# Patient Record
Sex: FEMALE | Race: WHITE | NOT HISPANIC OR LATINO | Employment: FULL TIME | URBAN - METROPOLITAN AREA
[De-identification: names, ages, dates, MRNs, and addresses within clinical notes are randomized per-mention and may not be internally consistent; named-entity substitution may affect disease eponyms.]

---
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
## 2018-07-18 ENCOUNTER — OFFICE VISIT (OUTPATIENT)
Dept: FAMILY MEDICINE | Facility: CLINIC | Age: 18
End: 2018-07-18
Payer: COMMERCIAL

## 2018-07-18 VITALS
DIASTOLIC BLOOD PRESSURE: 55 MMHG | HEART RATE: 85 BPM | SYSTOLIC BLOOD PRESSURE: 100 MMHG | WEIGHT: 118 LBS | TEMPERATURE: 99.9 F | RESPIRATION RATE: 14 BRPM | OXYGEN SATURATION: 98 % | HEIGHT: 61 IN | BODY MASS INDEX: 22.28 KG/M2

## 2018-07-18 DIAGNOSIS — Z01.84 IMMUNITY STATUS TESTING: ICD-10-CM

## 2018-07-18 DIAGNOSIS — Z11.1 SCREENING EXAMINATION FOR PULMONARY TUBERCULOSIS: Primary | ICD-10-CM

## 2018-07-18 PROCEDURE — 99213 OFFICE O/P EST LOW 20 MIN: CPT | Performed by: PHYSICIAN ASSISTANT

## 2018-07-18 PROCEDURE — 36415 COLL VENOUS BLD VENIPUNCTURE: CPT | Performed by: PHYSICIAN ASSISTANT

## 2018-07-18 PROCEDURE — 86706 HEP B SURFACE ANTIBODY: CPT | Performed by: PHYSICIAN ASSISTANT

## 2018-07-18 PROCEDURE — 86580 TB INTRADERMAL TEST: CPT | Performed by: PHYSICIAN ASSISTANT

## 2018-07-18 ASSESSMENT — PAIN SCALES - GENERAL: PAINLEVEL: NO PAIN (0)

## 2018-07-18 NOTE — PROGRESS NOTES
"  SUBJECTIVE:   Toña Ruggiero is a 18 year old female who presents to clinic today for the following health issues:      Titers for school- hep b and patient needs PPD placement.   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 She will had the hep B series as a child and she will need a titer and PPD testing prior to starting college this fall.       Problem list and histories reviewed & adjusted, as indicated.  Additional history: as documented    Patient Active Problem List   Diagnosis     Epistaxis     Acne vulgaris     Past Surgical History:   Procedure Laterality Date     HC TOOTH EXTRACTION W/FORCEP       PE TUBES  11/00       Social History   Substance Use Topics     Smoking status: Never Smoker     Smokeless tobacco: Never Used     Alcohol use No     Family History   Problem Relation Age of Onset     Cerebrovascular Disease Maternal Grandmother      HEART DISEASE Maternal Grandfather      Cerebrovascular Disease Maternal Grandfather      Cancer Paternal Grandmother      lung-smoker         Current Outpatient Prescriptions   Medication Sig Dispense Refill     Multiple Vitamins-Minerals (MULTIPLE VITAMINS/WOMENS PO)        norgestim-eth estrad triphasic (ORTHO TRI-CYCLEN, TRI-SPRINTEC) 0.18/0.215/0.25 MG-35 MCG per tablet Take 1 tablet by mouth daily 84 tablet 3     No Known Allergies    Reviewed and updated as needed this visit by clinical staff       Reviewed and updated as needed this visit by Provider         ROS:  Constitutional, HEENT, cardiovascular, pulmonary, gi and gu systems are negative, except as otherwise noted.    OBJECTIVE:     /55  Pulse 85  Temp 99.9  F (37.7  C) (Oral)  Resp 14  Ht 5' 1\" (1.549 m)  Wt 118 lb (53.5 kg)  SpO2 98%  BMI 22.3 kg/m2  Body mass index is 22.3 kg/(m^2).  GENERAL: healthy, alert and no distress  EYES: Eyes grossly normal to inspection, PERRL and conjunctivae and sclerae normal  MS: no gross musculoskeletal defects noted, no edema  SKIN: no " suspicious lesions or rashes  PSYCH: mentation appears normal, affect normal/bright    Diagnostic Test Results:  pending    ASSESSMENT/PLAN:       1. Screening examination for pulmonary tuberculosis  PPD placed  - TB INTRADERMAL TEST    2. Immunity status testing  I will contact her with results and send a letter for her documentation for school  - Hepatitis B Surface Antibody      Kristen M. Kehr, PA-C  [facility]    The patient is asked the following questions today and these are her answers:    -Have you had a mantoux administered in the past 30 days?    No  -Have you had a previous positive Mantoux.  No  -Have you received BCG in the past.  No  -Have you had a live vaccine  (MMR, Varicella, OPV, Yellow Fever) in the last 6 weeks.  No  -Have you had and active  viral or bacterial infection in the past 6 weeks.  No  -Have you received corticosteroids or immunosuppressive agents in the past 6 weeks.  No  -Have you been diagnosed with HIV?  No  -Do you have a maglinancy?  No      Mahesh LOPEZ MA

## 2018-07-18 NOTE — NURSING NOTE
"Chief Complaint   Patient presents with     Blood Draw     titers      Health Maintenance     none       Initial /55  Pulse 85  Temp 99.9  F (37.7  C) (Oral)  Resp 14  Ht 5' 1\" (1.549 m)  Wt 118 lb (53.5 kg)  SpO2 98%  BMI 22.3 kg/m2 Estimated body mass index is 22.3 kg/(m^2) as calculated from the following:    Height as of this encounter: 5' 1\" (1.549 m).    Weight as of this encounter: 118 lb (53.5 kg).  Medication Reconciliation: complete    KOKO Duran MA    "

## 2018-07-18 NOTE — MR AVS SNAPSHOT
"              After Visit Summary   7/18/2018    Toña Ruggiero    MRN: [id number]           Patient Information     Date Of Birth          2000        Visit Information        Provider Department      7/18/2018 9:00 AM Kehr, Kristen M, PA-C [facility]        Today's Diagnoses     Screening examination for pulmonary tuberculosis    -  1    Immunity status testing           Follow-ups after your visit        Your next 10 appointments already scheduled     Jul 20, 2018  9:30 AM CDT   Nurse Only with An Rn   [facility]    [address] [facility] [address] [phone]              Who to contact     If you have questions or need follow up information about today's clinic visit or your schedule please contact [facility] directly at [phone].  Normal or non-critical lab and imaging results will be communicated to you by MyChart, letter or phone within 4 business days after the clinic has received the results. If you do not hear from us within 7 days, please contact the clinic through MyChart or phone. If you have a critical or abnormal lab result, we will notify you by phone as soon as possible.  Submit refill requests through [employer] or call your pharmacy and they will forward the refill request to us. Please allow 3 business days for your refill to be completed.          Additional Information About Your Visit        Care EveryWhere ID     This is your Care EveryWhere ID. This could be used by other organizations to access your [place] medical records  [id number]        Your Vitals Were     Pulse Temperature Respirations Height Pulse Oximetry BMI (Body Mass Index)    85 99.9  F (37.7  C) (Oral) 14 5' 1\" (1.549 m) 98% 22.3 kg/m2       Blood Pressure from Last 3 Encounters:   07/18/18 100/55   12/18/17 113/67   06/14/16 91/57    Weight from Last 3 Encounters:   07/18/18 118 lb (53.5 kg)   12/18/17 115 lb (52.2 kg) (24 %)* "   06/14/16 110 lb (49.9 kg) (20 %)*     * Growth percentiles are based on CDC 2-20 Years data.              We Performed the Following     Hepatitis B Surface Antibody     TB INTRADERMAL TEST        Primary Care Provider Office Phone # Fax #    [facility] [phone] [address] [facility] [address]        Equal Access to Services     LINDSEY NEWELL : Hadii aad ku hadasho Soomaali, waaxda luqadaha, qaybta kaalmada adeegyada, waxay idiin hayaan adeeg kharash la'aan ah. So [facility] [phone].    ATENCIÓN: Si habla español, tiene a joseph disposición servicios gratuitos de asistencia lingüística. Llame al [phone].    We comply with applicable federal civil rights laws and [place] laws. We do not discriminate on the basis of race, color, national origin, age, disability, sex, sexual orientation, or gender identity.            Thank you!     Thank you for choosing [facility]  for your care. Our goal is always to provide you with excellent care. Hearing back from our patients is one way we can continue to improve our services. Please take a few minutes to complete the written survey that you may receive in the mail after your visit with us. Thank you!             Your Updated Medication List - Protect others around you: Learn how to safely use, store and throw away your medicines at www.disposemymeds.org.          This list is accurate as of 7/18/18 10:24 AM.  Always use your most recent med list.                   Brand Name Dispense Instructions for use Diagnosis    MULTIPLE VITAMINS/WOMENS PO           norgestim-eth estrad triphasic 0.18/0.215/0.25 MG-35 MCG per tablet    ORTHO TRI-CYCLEN, TRI-SPRINTEC    84 tablet    Take 1 tablet by mouth daily    Encounter for surveillance of contraceptive pills

## 2018-07-19 ENCOUNTER — TELEPHONE (OUTPATIENT)
Dept: FAMILY MEDICINE | Facility: CLINIC | Age: 18
End: 2018-07-19

## 2018-07-19 LAB — HBV SURFACE AB SERPL IA-ACNC: 8.73 M[IU]/ML

## 2018-07-19 NOTE — PROGRESS NOTES
See telephone encounter. She will need a booster and recheck 1-2 months after that, if still low on recheck, then she completes the series again.   Kristen Kehr PA-C

## 2018-07-19 NOTE — TELEPHONE ENCOUNTER
Left message on voicemail for patient to call back.     Direct number given: [phone].  Caryn Pichardo, ZAINABN, RN

## 2018-07-19 NOTE — TELEPHONE ENCOUNTER
Nursing:     Please contact Toña and let her know that her Hep B antibody is less than 10 which means she does not have immunity.     *The protocol is for her to have 1 dose of the vaccine and then repeat the titer in 1-2 months after it is given.     *See below: if it is still low after the next test, she will need to complete the full series.     *I am hoping that this can be coordinated to give her the vaccine when she is here for her PPD read tomorrow with nursing    Kristen Kehr PA-C                  Results for orders placed or performed in visit on 07/18/18   Hepatitis B Surface Antibody   Result Value Ref Range    Hepatitis B Surface Antibody 8.73 (H) <8.00 m[IU]/mL           If the anti-HBs titer is <10 mIU/ML the individual should receive a single additional dose of vaccine and have anti-HBs titers repeated in one to two months. An adequate antibody response is seen in 15 to 25 percent after one additional dose of vaccine [52].  ?If, after this additional dose, the anti-HBs titer is still <10 mIU/mL, two additional doses should be given followed by repeat postvaccination serologic testing at one to two months. An adequate antibody response is seen in 50 percent after three additional doses [52].

## 2018-07-20 ENCOUNTER — ALLIED HEALTH/NURSE VISIT (OUTPATIENT)
Dept: NURSING | Facility: CLINIC | Age: 18
End: 2018-07-20
Payer: COMMERCIAL

## 2018-07-20 DIAGNOSIS — Z23 NEED FOR VACCINATION: Primary | ICD-10-CM

## 2018-07-20 LAB
PPDINDURATION: 0 MM (ref 0–5)
PPDREDNESS: 0 MM

## 2018-07-20 PROCEDURE — 90471 IMMUNIZATION ADMIN: CPT

## 2018-07-20 PROCEDURE — 90746 HEPB VACCINE 3 DOSE ADULT IM: CPT

## 2018-07-20 PROCEDURE — 99207 ZZC NO CHARGE NURSE ONLY: CPT

## 2018-07-20 NOTE — TELEPHONE ENCOUNTER
Patient was given results as written below.   Patient verbalizes good understanding and agrees with plan. Hep B shot given to patient during mantoux read. Cecy Cooley R.N.

## 2018-07-20 NOTE — PROGRESS NOTES
Screening Questionnaire for Adult Immunization    Are you sick today?   No   Do you have allergies to medications, food, a vaccine component or latex?   No   Have you ever had a serious reaction after receiving a vaccination?   No   Do you have a long-term health problem with heart disease, lung disease, asthma, kidney disease, metabolic disease (e.g. diabetes), anemia, or other blood disorder?   No   Do you have cancer, leukemia, HIV/AIDS, or any other immune system problem?   No   In the past 3 months, have you taken medications that affect  your immune system, such as prednisone, other steroids, or anticancer drugs; drugs for the treatment of rheumatoid arthritis, Crohn s disease, or psoriasis; or have you had radiation treatments?   No   Have you had a seizure, or a brain or other nervous system problem?   No   During the past year, have you received a transfusion of blood or blood     products, or been given immune (gamma) globulin or antiviral drug?   No   For women: Are you pregnant or is there a chance you could become        pregnant during the next month?   No   Have you received any vaccinations in the past 4 weeks?   No     Immunization questionnaire answers were all negative.        Per orders of Kristen Kehr, injection of Hep B given by Cecy Cooley. Patient instructed to remain in clinic for 15 minutes afterwards, and to report any adverse reaction to me immediately.       Screening performed by Cecy Cooley on 7/20/2018 at 9:44 AM.    Prior to injection verified patient identity using patient's name and date of birth.  Due to injection administration, patient instructed to remain in clinic for 15 minutes  afterwards, and to report any adverse reaction to me immediately.

## 2018-07-20 NOTE — MR AVS SNAPSHOT
After Visit Summary   7/20/2018    Toña Ruggiero    MRN: [id number]           Patient Information     Date Of Birth          2000        Visit Information        Provider Department      7/20/2018 9:30 AM Rn, An [facility]        Today's Diagnoses     Need for vaccination    -  1       Follow-ups after your visit        Who to contact     If you have questions or need follow up information about today's clinic visit or your schedule please contact [facility] directly at [phone].  Normal or non-critical lab and imaging results will be communicated to you by MyChart, letter or phone within 4 business days after the clinic has received the results. If you do not hear from us within 7 days, please contact the clinic through MyChart or phone. If you have a critical or abnormal lab result, we will notify you by phone as soon as possible.  Submit refill requests through [facility] or call your pharmacy and they will forward the refill request to us. Please allow 3 business days for your refill to be completed.          Additional Information About Your Visit        Care EveryWhere ID     This is your Care EveryWhere ID. This could be used by other organizations to access your [facility] medical records  [id number]         Blood Pressure from Last 3 Encounters:   07/18/18 100/55   12/18/17 113/67   06/14/16 91/57    Weight from Last 3 Encounters:   07/18/18 118 lb (53.5 kg)   12/18/17 115 lb (52.2 kg) (24 %)*   06/14/16 110 lb (49.9 kg) (20 %)*     * Growth percentiles are based on CDC 2-20 Years data.              We Performed the Following     1st  Administration  [92330]     HEPATITIS B VACCINE,  ADULT  [[id number]]        Primary Care Provider Office Phone # Fax #    [facility] [phone] [address] [facility] [address]        Equal Access to Services     LINDSEY NEWELL AH: Bradley Olson, maricruz guadalupe, indio navarro  arya jcdenise gennaailyn malin ah. Sharyn [facility] [phone].    ATENCIÓN: Si habla berta, tiene a joseph disposición servicios gratuitos de asistencia lingüística. Lesly al [phone].    We comply with applicable federal civil rights laws and [place] laws. We do not discriminate on the basis of race, color, national origin, age, disability, sex, sexual orientation, or gender identity.            Thank you!     [facility] you [facility]  [facility]. Our goal is always to provide you with excellent care. Hearing back from our patients is one way we can continue to improve our services. Please take a few minutes to complete the written survey that you may receive in the mail after your visit with us. Thank you!             Your Updated Medication List - Protect others around you: Learn how to safely use, store and throw away your medicines at www.disposemymeds.org.          This list is accurate as of 7/20/18  9:51 AM.  Always use your most recent med list.                   Brand Name Dispense Instructions for use Diagnosis    MULTIPLE VITAMINS/WOMENS PO           norgestim-eth estrad triphasic 0.18/0.215/0.25 MG-35 MCG per tablet    ORTHO TRI-CYCLEN, TRI-SPRINTEC    84 tablet    Take 1 tablet by mouth daily    Encounter for surveillance of contraceptive pills

## 2018-07-31 ENCOUNTER — ALLIED HEALTH/NURSE VISIT (OUTPATIENT)
Dept: NURSING | Facility: CLINIC | Age: 18
End: 2018-07-31
Payer: COMMERCIAL

## 2018-07-31 DIAGNOSIS — Z11.1 SCREENING EXAMINATION FOR PULMONARY TUBERCULOSIS: Primary | ICD-10-CM

## 2018-07-31 PROCEDURE — 99207 ZZC NO CHARGE NURSE ONLY: CPT

## 2018-07-31 PROCEDURE — 86580 TB INTRADERMAL TEST: CPT

## 2018-07-31 NOTE — PROGRESS NOTES
The patient is asked the following questions today and these are her answers:    -Have you had a mantoux administered in the past 30 days?   YES, 2 step  -Have you had a previous positive Mantoux.  No  -Have you received BCG in the past.  No  -Have you had a live vaccine  (MMR, Varicella, OPV, Yellow Fever) in the last 6 weeks.  No  -Have you had and active  viral or bacterial infection in the past 6 weeks.  No  -Have you received corticosteroids or immunosuppressive agents in the past 6 weeks.  No  -Have you been diagnosed with HIV?  No  -Do you have a maglinancy?  No     Lina Esquivel MA

## 2018-07-31 NOTE — LETTER
August 3, 2018    Toña Ruggiero  [address] [address]        Dear Toña,    Below is a copy of the results.      If you have any questions or concerns, please call myself or my nurse at [phone].    Sincerely,    Kristen Kehr, PA-C/jose    Results for orders placed or performed in visit on 07/31/18   TB INTRADERMAL TEST   Result Value Ref Range    PPD Induration 0 0 - 5 mm    PPD Redness 0 mm    Impression    Mantoux results: No induration.  No swelling.  No redness.

## 2018-08-03 ENCOUNTER — ALLIED HEALTH/NURSE VISIT (OUTPATIENT)
Dept: NURSING | Facility: CLINIC | Age: 18
End: 2018-08-03
Payer: COMMERCIAL

## 2018-08-03 DIAGNOSIS — Z11.1 SCREENING EXAMINATION FOR PULMONARY TUBERCULOSIS: Primary | ICD-10-CM

## 2018-08-03 LAB
PPDINDURATION: 0 MM (ref 0–5)
PPDREDNESS: 0 MM

## 2018-08-03 PROCEDURE — 99207 ZZC NO CHARGE NURSE ONLY: CPT

## 2018-11-29 DIAGNOSIS — Z30.41 ENCOUNTER FOR SURVEILLANCE OF CONTRACEPTIVE PILLS: ICD-10-CM

## 2018-11-29 NOTE — LETTER
November 30, 2018    Toña Ruggiero  [address] [address]    Dear Toña,       We recently received a refill request for TRI-SPRINTEC 0.18/0.215/0.25 MG-35 MCG tablet.  We have refilled this for one time only because you are due for a:    Physical office visit      Please call at your earliest convenience so that there will not be a delay with your future refills.          Thank you,   Your [facility] Team/  [phone]

## 2018-11-30 RX ORDER — NORGESTIMATE AND ETHINYL ESTRADIOL 7DAYSX3 28
KIT ORAL
Qty: 84 TABLET | Refills: 0 | Status: SHIPPED | OUTPATIENT
Start: 2018-11-30 | End: 2019-01-02

## 2018-11-30 NOTE — TELEPHONE ENCOUNTER
Medication is being filled for 1 time refill only due to:  Patient needs to be seen because due for afe.   Linn Mcdonnell BSN, RN

## 2019-01-02 ENCOUNTER — OFFICE VISIT (OUTPATIENT)
Dept: FAMILY MEDICINE | Facility: CLINIC | Age: 19
End: 2019-01-02
Payer: COMMERCIAL

## 2019-01-02 VITALS
HEIGHT: 61 IN | BODY MASS INDEX: 22.69 KG/M2 | HEART RATE: 94 BPM | SYSTOLIC BLOOD PRESSURE: 113 MMHG | OXYGEN SATURATION: 100 % | TEMPERATURE: 97.4 F | DIASTOLIC BLOOD PRESSURE: 75 MMHG | WEIGHT: 120.2 LBS

## 2019-01-02 DIAGNOSIS — L70.0 ACNE VULGARIS: Primary | ICD-10-CM

## 2019-01-02 DIAGNOSIS — Z30.41 ENCOUNTER FOR SURVEILLANCE OF CONTRACEPTIVE PILLS: ICD-10-CM

## 2019-01-02 PROCEDURE — 99213 OFFICE O/P EST LOW 20 MIN: CPT | Performed by: PHYSICIAN ASSISTANT

## 2019-01-02 RX ORDER — NORGESTIMATE AND ETHINYL ESTRADIOL 7DAYSX3 28
1 KIT ORAL DAILY
Qty: 84 TABLET | Refills: 3 | Status: SHIPPED | OUTPATIENT
Start: 2019-01-02 | End: 2019-07-31

## 2019-01-02 ASSESSMENT — MIFFLIN-ST. JEOR: SCORE: 1252.6

## 2019-01-02 NOTE — PROGRESS NOTES
"SUBJECTIVE:                                                    Toña Ruggiero is a 18 year old female who presents to clinic today for the following health issues:    Medication Followup of Tri-Sprintec    Taking Medication as prescribed: yes    Side Effects:  None    Medication Helping Symptoms:  Yes    Takes med for acne and period regulation. Not sexually active.      Non-smoker    Problem list and histories reviewed & adjusted, as indicated.  Additional history: as documented      Patient Active Problem List   Diagnosis     Epistaxis     Acne vulgaris     Past Surgical History:   Procedure Laterality Date     HC TOOTH EXTRACTION W/FORCEP       PE TUBES  11/00       Social History     Tobacco Use     Smoking status: Never Smoker     Smokeless tobacco: Never Used   Substance Use Topics     Alcohol use: No     Family History   Problem Relation Age of Onset     Cerebrovascular Disease Maternal Grandmother      Heart Disease Maternal Grandfather      Cerebrovascular Disease Maternal Grandfather      Cancer Paternal Grandmother         lung-smoker         Current Outpatient Medications   Medication Sig Dispense Refill     Multiple Vitamins-Minerals (MULTIPLE VITAMINS/WOMENS PO)        norgestim-eth estrad triphasic (TRI-SPRINTEC) 0.18/0.215/0.25 MG-35 MCG tablet Take 1 tablet by mouth daily 84 tablet 3     No Known Allergies    ROS:  Constitutional, HEENT, cardiovascular, pulmonary, gi and gu systems are negative, except as otherwise noted.    OBJECTIVE:     /75   Pulse 94   Temp 97.4  F (36.3  C) (Oral)   Ht 1.549 m (5' 1\")   Wt 54.5 kg (120 lb 3.2 oz)   SpO2 100%   BMI 22.71 kg/m    Body mass index is 22.71 kg/m .  GENERAL: healthy, alert and no distress  PSYCH: mentation appears normal, affect normal/bright    Diagnostic Test Results:  none     ASSESSMENT/PLAN:         ICD-10-CM    1. Acne vulgaris L70.0    2. Encounter for surveillance of contraceptive pills Z30.41 norgestim-eth estrad triphasic " (TRI-SPRINTEC) 0.18/0.215/0.25 MG-35 MCG tablet   ok for refills  Due for PAP at age 19.   warning signs discussed.      Frankie Brumfield PA-C  [facility]

## 2019-07-31 ENCOUNTER — OFFICE VISIT (OUTPATIENT)
Dept: FAMILY MEDICINE | Facility: CLINIC | Age: 19
End: 2019-07-31
Payer: COMMERCIAL

## 2019-07-31 VITALS
SYSTOLIC BLOOD PRESSURE: 102 MMHG | HEART RATE: 77 BPM | BODY MASS INDEX: 22.77 KG/M2 | TEMPERATURE: 99.9 F | DIASTOLIC BLOOD PRESSURE: 64 MMHG | OXYGEN SATURATION: 99 % | HEIGHT: 61 IN | WEIGHT: 120.6 LBS

## 2019-07-31 DIAGNOSIS — L70.0 ACNE VULGARIS: ICD-10-CM

## 2019-07-31 DIAGNOSIS — Z00.00 ROUTINE GENERAL MEDICAL EXAMINATION AT A HEALTH CARE FACILITY: Primary | ICD-10-CM

## 2019-07-31 DIAGNOSIS — B37.31 YEAST INFECTION OF THE VAGINA: ICD-10-CM

## 2019-07-31 DIAGNOSIS — Z30.41 ENCOUNTER FOR SURVEILLANCE OF CONTRACEPTIVE PILLS: ICD-10-CM

## 2019-07-31 DIAGNOSIS — Z12.4 SCREENING FOR MALIGNANT NEOPLASM OF CERVIX: ICD-10-CM

## 2019-07-31 DIAGNOSIS — Z11.4 SCREENING FOR HIV (HUMAN IMMUNODEFICIENCY VIRUS): ICD-10-CM

## 2019-07-31 DIAGNOSIS — Z11.3 SCREEN FOR STD (SEXUALLY TRANSMITTED DISEASE): ICD-10-CM

## 2019-07-31 PROCEDURE — 87389 HIV-1 AG W/HIV-1&-2 AB AG IA: CPT | Performed by: NURSE PRACTITIONER

## 2019-07-31 PROCEDURE — G0145 SCR C/V CYTO,THINLAYER,RESCR: HCPCS | Performed by: NURSE PRACTITIONER

## 2019-07-31 PROCEDURE — 99395 PREV VISIT EST AGE 18-39: CPT | Performed by: NURSE PRACTITIONER

## 2019-07-31 PROCEDURE — 87591 N.GONORRHOEAE DNA AMP PROB: CPT | Performed by: NURSE PRACTITIONER

## 2019-07-31 PROCEDURE — 99213 OFFICE O/P EST LOW 20 MIN: CPT | Mod: 25 | Performed by: NURSE PRACTITIONER

## 2019-07-31 PROCEDURE — 87491 CHLMYD TRACH DNA AMP PROBE: CPT | Performed by: NURSE PRACTITIONER

## 2019-07-31 PROCEDURE — 36415 COLL VENOUS BLD VENIPUNCTURE: CPT | Performed by: NURSE PRACTITIONER

## 2019-07-31 RX ORDER — NORGESTIMATE AND ETHINYL ESTRADIOL 7DAYSX3 28
1 KIT ORAL DAILY
Qty: 84 TABLET | Refills: 3 | Status: SHIPPED | OUTPATIENT
Start: 2019-07-31 | End: 2020-06-18

## 2019-07-31 RX ORDER — FLUCONAZOLE 150 MG/1
150 TABLET ORAL DAILY
Qty: 2 TABLET | Refills: 0 | Status: SHIPPED | OUTPATIENT
Start: 2019-07-31 | End: 2019-08-14

## 2019-07-31 RX ORDER — FLUCONAZOLE 150 MG/1
150 TABLET ORAL DAILY
Qty: 2 TABLET | Refills: 0 | Status: SHIPPED | OUTPATIENT
Start: 2019-07-31 | End: 2019-07-31

## 2019-07-31 ASSESSMENT — MIFFLIN-ST. JEOR: SCORE: 1249.42

## 2019-07-31 NOTE — LETTER
August 5, 2019    Deepa Ruggiero  [address] [address]            Dear Deepa,    Your pap smear and lab work are all normal.    If you have any questions or concerns, please call myself or my nurse at [phone].    Sincerely,    Hammad Mondragon NP/kermit    Results for orders placed or performed in visit on 07/31/19   HIV Screening   Result Value Ref Range    HIV Antigen Antibody Combo Nonreactive NR^Nonreactive       Pap imaged thin layer screen only - recommended age 21 - 24 years   Result Value Ref Range    PAP NIL     Copath Report         Patient Name: DEEPA RUGGIERO  MR#: [id number]  Specimen #: [id number]  Collected: 7/31/2019  Received: 8/1/2019  Reported: 8/5/2019 14:01  Ordering Phy(s): HAMMAD MONDRAGON    For improved result formatting, select 'View Enhanced Report Format' under   Linked Documents section.    SPECIMEN/STAIN PROCESS:  Pap imaged thin layer prep screening (Surepath, FocalPoint with guided   screening)       Pap-Cyto x 1    SOURCE: Cervical, endocervical  ----------------------------------------------------------------   Pap imaged thin layer prep screening (Surepath, FocalPoint with guided   screening)  SPECIMEN ADEQUACY:  Satisfactory for evaluation.  -Transformation zone component absent.    CYTOLOGIC INTERPRETATION:    Negative for intraepithelial lesion or malignancy    Electronically signed out by:  CARLOS MANUEL Blandon (ASCP)    CLINICAL HISTORY:  LMP: 07/24/2019    Papanicolaou Test Limitations:  Cervical cytology is a screening test with   limited sensitivity; regular  screening is critical fo r cancer prevention; Pap tests are primarily   effective for the diagnosis/prevention of  squamous cell carcinoma, not adenocarcinomas or other cancers.    COLLECTION SITE:  Client:  [facility], [place]  Location: [facility] ([facility])    The technical component of this testing was completed at the [facility], with the professional component performed   at the [facility], [address],   [place] [address] ([phone])       Chlamydia trachomatis PCR   Result Value Ref Range    Specimen Description Cervix     Chlamydia Trachomatis PCR Negative NEG^Negative   Neisseria gonorrhoeae PCR   Result Value Ref Range    Specimen Descrip Cervix     N Gonorrhea PCR Negative NEG^Negative

## 2019-07-31 NOTE — PROGRESS NOTES
SUBJECTIVE:   CC: Toña Ruggiero is an 19 year old woman who presents for preventive health visit. She reports doing well since last visit.  She is going to school for Pharmacy.  Reports oral contraceptive working well.  She initially started OCP for acne, which she found to be stress induced.  States she has noticed a few more outbreaks over the summer.  She reports being tx;d for UTI earlier this month and developed a subsequent vaginal yeast infection.  She used Monistat OTC which resolved the discharge but she continues to have some vaginal itching.     Healthy Habits:    Do you get at least three servings of calcium containing foods daily (dairy, green leafy vegetables, etc.)? yes    Amount of exercise or daily activities, outside of work: 3-4 day(s) per week    Problems taking medications regularly No    Medication side effects: No    Have you had an eye exam in the past two years? yes    Do you see a dentist twice per year? yes    Do you have sleep apnea, excessive snoring or daytime drowsiness?no        Today's PHQ-2 Score:   PHQ-2 ( 1999 Pfizer) 7/31/2019 1/2/2019   Q1: Little interest or pleasure in doing things 0 0   Q2: Feeling down, depressed or hopeless 0 0   PHQ-2 Score 0 0   Q1: Little interest or pleasure in doing things - -   Q2: Feeling down, depressed or hopeless - -   PHQ-2 Score Incomplete -       Abuse: Current or Past(Physical, Sexual or Emotional)- No  Do you feel safe in your environment? Yes    Social History     Tobacco Use     Smoking status: Never Smoker     Smokeless tobacco: Never Used   Substance Use Topics     Alcohol use: Yes     If you drink alcohol do you typically have >3 drinks per day or >7 drinks per week? No                     Reviewed orders with patient.  Reviewed health maintenance and updated orders accordingly - Yes  Patient Active Problem List   Diagnosis     Epistaxis     Acne vulgaris     Past Surgical History:   Procedure Laterality Date     HC TOOTH EXTRACTION  W/FORCEP       PE TUBES  11/00       Social History     Tobacco Use     Smoking status: Never Smoker     Smokeless tobacco: Never Used   Substance Use Topics     Alcohol use: Yes     Family History   Problem Relation Age of Onset     Cerebrovascular Disease Maternal Grandmother      Heart Disease Maternal Grandfather      Cerebrovascular Disease Maternal Grandfather      Cancer Paternal Grandmother         lung-smoker         Current Outpatient Medications   Medication Sig Dispense Refill     fluconazole (DIFLUCAN) 150 MG tablet Take 1 tablet (150 mg) by mouth daily 2 tablet 0     Multiple Vitamins-Minerals (MULTIPLE VITAMINS/WOMENS PO)        norgestim-eth estrad triphasic (TRI-SPRINTEC) 0.18/0.215/0.25 MG-35 MCG tablet Take 1 tablet by mouth daily 84 tablet 3     No Known Allergies    Mammogram not appropriate for this patient based on age.    Pertinent mammograms are reviewed under the imaging tab.  History of abnormal Pap smear: NO - age 21-29 PAP every 3 years recommended     Reviewed and updated as needed this visit by clinical staff  Tobacco  Allergies  Meds  Med Hx  Surg Hx  Fam Hx  Soc Hx        Reviewed and updated as needed this visit by Provider            ROS:  CONSTITUTIONAL: NEGATIVE for fever, chills, change in weight  INTEGUMENTARU/SKIN: NEGATIVE for worrisome rashes, moles or lesions  EYES: NEGATIVE for vision changes or irritation  ENT: NEGATIVE for ear, mouth and throat problems  RESP: NEGATIVE for significant cough or SOB  BREAST: NEGATIVE for masses, tenderness or discharge  CV: NEGATIVE for chest pain, palpitations or peripheral edema  GI: NEGATIVE for nausea, abdominal pain, heartburn, or change in bowel habits  : NEGATIVE for unusual urinary or vaginal symptoms. Periods are regular.  MUSCULOSKELETAL: NEGATIVE for significant arthralgias or myalgia  NEURO: NEGATIVE for weakness, dizziness or paresthesias  PSYCHIATRIC: NEGATIVE for changes in mood or affect    OBJECTIVE:   /64    "Pulse 77   Temp 99.9  F (37.7  C) (Oral)   Ht 1.549 m (5' 1\")   Wt 54.7 kg (120 lb 9.6 oz)   LMP 07/24/2019 (Exact Date)   SpO2 99%   BMI 22.79 kg/m    EXAM:  GENERAL: healthy, alert and no distress  EYES: Eyes grossly normal to inspection, PERRL and conjunctivae and sclerae normal  HENT: ear canals and TM's normal, nose and mouth without ulcers or lesions  NECK: no adenopathy, no asymmetry, masses, or scars and thyroid normal to palpation  RESP: lungs clear to auscultation - no rales, rhonchi or wheezes  BREAST: normal without masses, tenderness or nipple discharge and no palpable axillary masses or adenopathy  BREAST: normal without masses, tenderness or nipple discharge and no palpable axillary masses or adenopathy  CV: regular rate and rhythm, normal S1 S2, no S3 or S4, no murmur, click or rub, no peripheral edema and peripheral pulses strong  ABDOMEN: soft, nontender, no hepatosplenomegaly, no masses and bowel sounds normal  ABDOMEN: soft, nontender, without hepatosplenomegaly or masses and bowel sounds normal   (female): normal female external genitalia, normal urethral meatus, vaginal mucosa pink, moist, well rugated, and normal cervix/adnexa/uterus without masses or discharge. Pap specimen collected. Pt tolerated well.  Aracelis CORDOVA present in room during exam.   MS: no gross musculoskeletal defects noted, no edema  SKIN: no suspicious lesions or rashes  NEURO: Normal strength and tone, mentation intact and speech normal  PSYCH: mentation appears normal, affect normal/bright        ASSESSMENT/PLAN:   1. Routine general medical examination at a health care facility  - overall doing very well.     2. Screening for malignant neoplasm of cervix  - Pap imaged thin layer screen only - recommended age 21 - 24 years    3. Acne vulgaris  - stable.  Has noticed a few more breakouts. Advised to contact office if she wishes to try a different OCP in the future.   - norgestim-eth estrad triphasic (TRI-SPRINTEC) " "0.18/0.215/0.25 MG-35 MCG tablet; Take 1 tablet by mouth daily  Dispense: 84 tablet; Refill: 3    4. Encounter for surveillance of contraceptive pills  - norgestim-eth estrad triphasic (TRI-SPRINTEC) 0.18/0.215/0.25 MG-35 MCG tablet; Take 1 tablet by mouth daily  Dispense: 84 tablet; Refill: 3    5. Screening for HIV (human immunodeficiency virus)  - HIV Screening    6. Screen for STD (sexually transmitted disease)  - Chlamydia trachomatis PCR  - Neisseria gonorrhoeae PCR    7. Yeast infection of the vagina  - fluconazole (DIFLUCAN) 150 MG tablet; Take 1 tablet (150 mg) by mouth daily  Dispense: 2 tablet; Refill: 0    COUNSELING:   Reviewed preventive health counseling, as reflected in patient instructions       Regular exercise       Healthy diet/nutrition       Safe sex practices/STD prevention    Estimated body mass index is 22.79 kg/m  as calculated from the following:    Height as of this encounter: 1.549 m (5' 1\").    Weight as of this encounter: 54.7 kg (120 lb 9.6 oz).         reports that she has never smoked. She has never used smokeless tobacco.      Counseling Resources:  ATP IV Guidelines  Pooled Cohorts Equation Calculator  Breast Cancer Risk Calculator  FRAX Risk Assessment  ICSI Preventive Guidelines  Dietary Guidelines for Americans, 2010  USDA's MyPlate  ASA Prophylaxis  Lung CA Screening    Eliza Romero NP  [facility]  "

## 2019-07-31 NOTE — LETTER
August 6, 2019      Toña Ruggiero  [address] [address]    Dear ,      I am happy to inform you that your recent cervical cancer screening test (PAP smear) was normal.      Preventative screenings such as this help to ensure your health for years to come. You should repeat a pap smear in 3 years, unless otherwise directed.      You will still need to return to the clinic every year for your annual exam and other preventive tests.     If you have additional questions regarding this result, please call our registered nurseCelestina at [phone].      Sincerely,      Eliza Romero NP/claire

## 2019-08-01 LAB — HIV 1+2 AB+HIV1 P24 AG SERPL QL IA: NONREACTIVE

## 2019-08-02 LAB
C TRACH DNA SPEC QL NAA+PROBE: NEGATIVE
N GONORRHOEA DNA SPEC QL NAA+PROBE: NEGATIVE
SPECIMEN SOURCE: NORMAL
SPECIMEN SOURCE: NORMAL

## 2019-08-05 LAB
COPATH REPORT: NORMAL
PAP: NORMAL

## 2019-08-13 ENCOUNTER — TELEPHONE (OUTPATIENT)
Dept: FAMILY MEDICINE | Facility: CLINIC | Age: 19
End: 2019-08-13

## 2019-08-13 DIAGNOSIS — B37.31 YEAST INFECTION OF THE VAGINA: ICD-10-CM

## 2019-08-13 RX ORDER — FLUCONAZOLE 150 MG/1
150 TABLET ORAL DAILY
Qty: 2 TABLET | Refills: 0 | Status: CANCELLED | OUTPATIENT
Start: 2019-08-13

## 2019-08-13 NOTE — TELEPHONE ENCOUNTER
Reason for Call:  Medication or medication refill:    Do you use a [facility]?  Name of the pharmacy and phone number for the current request:  [facility] in [place]    Name of the medication requested: Diflucan     Other request: states that [facility] called her regarding the directions, so she wants to make sure the directions are correct and have it sent to the [facility] instead.     Can we leave a detailed message on this number? YES    Phone number patient can be reached at: Home number on file [phone] (home)    Best Time: any     Call taken on 8/13/2019 at 10:27 AM by Kiesha Arreguin

## 2019-08-13 NOTE — TELEPHONE ENCOUNTER
Sig states to take one daily but 2 tablets were ordered.   Does provider mean she should take 1 tablet once only and then can refill for an additional tablet if she needs it?    If so, Rx should be changed to quantity 1 with a refill of 1.      Routing to provider to advise.  Caryn LAMBN, RN

## 2019-08-14 DIAGNOSIS — B37.31 YEAST INFECTION OF THE VAGINA: Primary | ICD-10-CM

## 2019-08-14 RX ORDER — FLUCONAZOLE 150 MG/1
150 TABLET ORAL
Qty: 2 TABLET | Refills: 0 | Status: SHIPPED | OUTPATIENT
Start: 2019-08-14 | End: 2019-08-18

## 2019-08-14 NOTE — TELEPHONE ENCOUNTER
I have sent a new Diflucan prescription to [facility] in [place].    Diflucan 150 mg PO. Take 1 tablet every 3 days x 2 doses.     Eliza LITTLE

## 2020-06-18 DIAGNOSIS — Z30.41 ENCOUNTER FOR SURVEILLANCE OF CONTRACEPTIVE PILLS: ICD-10-CM

## 2020-06-18 DIAGNOSIS — L70.0 ACNE VULGARIS: ICD-10-CM

## 2020-06-18 RX ORDER — NORGESTIMATE AND ETHINYL ESTRADIOL 7DAYSX3 28
1 KIT ORAL DAILY
Qty: 84 TABLET | Refills: 0 | Status: SHIPPED | OUTPATIENT
Start: 2020-06-18 | End: 2020-09-14

## 2020-09-09 DIAGNOSIS — Z30.41 ENCOUNTER FOR SURVEILLANCE OF CONTRACEPTIVE PILLS: ICD-10-CM

## 2020-09-09 DIAGNOSIS — L70.0 ACNE VULGARIS: ICD-10-CM

## 2020-09-09 RX ORDER — NORGESTIMATE AND ETHINYL ESTRADIOL 7DAYSX3 28
1 KIT ORAL DAILY
Qty: 84 TABLET | Refills: 0 | Status: CANCELLED | OUTPATIENT
Start: 2020-09-09

## 2020-09-11 NOTE — PROGRESS NOTES
"Toña Ruggiero is a 20 year old female who is being evaluated via a billable telephone visit.      The patient has been notified of following:     \"This telephone visit will be conducted via a call between you and your physician/provider. We have found that certain health care needs can be provided without the need for a physical exam.  This service lets us provide the care you need with a short phone conversation.  If a prescription is necessary we can send it directly to your pharmacy.  If lab work is needed we can place an order for that and you can then stop by our lab to have the test done at a later time.    Telephone visits are billed at different rates depending on your insurance coverage. During this emergency period, for some insurers they may be billed the same as an in-person visit.  Please reach out to your insurance provider with any questions.    If during the course of the call the physician/provider feels a telephone visit is not appropriate, you will not be charged for this service.\"    Patient has given verbal consent for Telephone visit?  Yes    What phone number would you like to be contacted at? [phone]    How would you like to obtain your AVS? Mail a copy    Subjective     Toña Ruggiero is a 20 year old female who presents via phone visit today for the following health issues:    HPI    BC med check and refill per patient.     Due for chlamydia.     Been on for several years.     Periods are regular on it . No side effects. Started on for acne. Acne is better on it. No h/o HTN.     Is single.        is living in [place] in college. Not allowed to come home per patient that is why this is a telephone visit per patient.         Declines chlamydia screening.     Review of Systems   Constitutional, HEENT, cardiovascular, pulmonary, GI, , musculoskeletal, neuro, skin, endocrine and psych systems are negative, except as otherwise noted.       Objective          Vitals:  No vitals were " obtained today due to virtual visit.    healthy, alert and no distress  PSYCH: Alert and oriented times 3; coherent speech, normal   rate and volume, able to articulate logical thoughts, able   to abstract reason, no tangential thoughts, no hallucinations   or delusions  Her affect is normal  RESP: No cough, no audible wheezing, able to talk in full sentences  Remainder of exam unable to be completed due to telephone visits            Assessment/Plan:    Assessment & Plan       ASSESSMENT / PLAN:  (Z11.3) Screen for STD (sexually transmitted disease)  (primary encounter diagnosis)  Comment:   Plan: Chlamydia trachomatis PCR            (Z30.41) Encounter for surveillance of contraceptive pills  Comment:  Doing well  Plan: norgestim-eth estrad triphasic (TRI-SPRINTEC)         0.18/0.215/0.25 MG-35 MCG tablet            (L70.0) Acne vulgaris  Comment:   Plan: norgestim-eth estrad triphasic (TRI-SPRINTEC)         0.18/0.215/0.25 MG-35 MCG tablet                       Return in about 1 year (around 9/14/2021) for Physical Exam, med recheck.    Corry Bailey PA-C  [facility]    Phone call duration:  6 minutes

## 2020-09-14 ENCOUNTER — DOCUMENTATION ONLY (OUTPATIENT)
Dept: LAB | Facility: CLINIC | Age: 20
End: 2020-09-14

## 2020-09-14 ENCOUNTER — VIRTUAL VISIT (OUTPATIENT)
Dept: FAMILY MEDICINE | Facility: CLINIC | Age: 20
End: 2020-09-14
Payer: COMMERCIAL

## 2020-09-14 DIAGNOSIS — Z30.41 ENCOUNTER FOR SURVEILLANCE OF CONTRACEPTIVE PILLS: ICD-10-CM

## 2020-09-14 DIAGNOSIS — L70.0 ACNE VULGARIS: ICD-10-CM

## 2020-09-14 DIAGNOSIS — Z11.3 SCREEN FOR STD (SEXUALLY TRANSMITTED DISEASE): Primary | ICD-10-CM

## 2020-09-14 PROCEDURE — 99213 OFFICE O/P EST LOW 20 MIN: CPT | Mod: 95 | Performed by: PHYSICIAN ASSISTANT

## 2020-09-14 PROCEDURE — 87491 CHLMYD TRACH DNA AMP PROBE: CPT | Performed by: PHYSICIAN ASSISTANT

## 2020-09-14 RX ORDER — NORGESTIMATE AND ETHINYL ESTRADIOL 7DAYSX3 28
1 KIT ORAL DAILY
Qty: 84 TABLET | Refills: 3 | Status: SHIPPED | OUTPATIENT
Start: 2020-09-14 | End: 2021-04-02

## 2020-09-14 NOTE — PROGRESS NOTES
Your patient did not come to lab today for the urine test you had ordered at today's visit with you.  I canceled today's order and placed future a order for this patient IN TODAY'S ENCOUNTER.  Patient will need to be contacted by your team to return to lab for it if needed.  [facility]

## 2021-04-02 ENCOUNTER — OFFICE VISIT (OUTPATIENT)
Dept: FAMILY MEDICINE | Facility: CLINIC | Age: 21
End: 2021-04-02
Payer: COMMERCIAL

## 2021-04-02 VITALS
DIASTOLIC BLOOD PRESSURE: 71 MMHG | HEIGHT: 62 IN | BODY MASS INDEX: 21.71 KG/M2 | WEIGHT: 118 LBS | HEART RATE: 72 BPM | SYSTOLIC BLOOD PRESSURE: 106 MMHG | TEMPERATURE: 96.1 F | OXYGEN SATURATION: 100 %

## 2021-04-02 DIAGNOSIS — Z11.59 NEED FOR HEPATITIS C SCREENING TEST: ICD-10-CM

## 2021-04-02 DIAGNOSIS — R42 DIZZINESS: ICD-10-CM

## 2021-04-02 DIAGNOSIS — Z00.00 ROUTINE GENERAL MEDICAL EXAMINATION AT A HEALTH CARE FACILITY: Primary | ICD-10-CM

## 2021-04-02 DIAGNOSIS — Z30.41 ENCOUNTER FOR SURVEILLANCE OF CONTRACEPTIVE PILLS: ICD-10-CM

## 2021-04-02 DIAGNOSIS — L70.0 ACNE VULGARIS: ICD-10-CM

## 2021-04-02 LAB
ANION GAP SERPL CALCULATED.3IONS-SCNC: 3 MMOL/L (ref 3–14)
BASOPHILS # BLD AUTO: 0 10E9/L (ref 0–0.2)
BASOPHILS NFR BLD AUTO: 0.6 %
BUN SERPL-MCNC: 14 MG/DL (ref 7–30)
CALCIUM SERPL-MCNC: 9.3 MG/DL (ref 8.5–10.1)
CHLORIDE SERPL-SCNC: 109 MMOL/L (ref 94–109)
CO2 SERPL-SCNC: 27 MMOL/L (ref 20–32)
CREAT SERPL-MCNC: 0.78 MG/DL (ref 0.52–1.04)
DIFFERENTIAL METHOD BLD: NORMAL
EOSINOPHIL # BLD AUTO: 0.3 10E9/L (ref 0–0.7)
EOSINOPHIL NFR BLD AUTO: 5.4 %
ERYTHROCYTE [DISTWIDTH] IN BLOOD BY AUTOMATED COUNT: 12.3 % (ref 10–15)
FERRITIN SERPL-MCNC: 49 NG/ML (ref 12–150)
GFR SERPL CREATININE-BSD FRML MDRD: >90 ML/MIN/{1.73_M2}
GLUCOSE SERPL-MCNC: 73 MG/DL (ref 70–99)
HCT VFR BLD AUTO: 38.9 % (ref 35–47)
HCV AB SERPL QL IA: NONREACTIVE
HGB BLD-MCNC: 12.8 G/DL (ref 11.7–15.7)
LYMPHOCYTES # BLD AUTO: 1.9 10E9/L (ref 0.8–5.3)
LYMPHOCYTES NFR BLD AUTO: 36.5 %
MCH RBC QN AUTO: 32 PG (ref 26.5–33)
MCHC RBC AUTO-ENTMCNC: 32.9 G/DL (ref 31.5–36.5)
MCV RBC AUTO: 97 FL (ref 78–100)
MONOCYTES # BLD AUTO: 0.6 10E9/L (ref 0–1.3)
MONOCYTES NFR BLD AUTO: 10.9 %
NEUTROPHILS # BLD AUTO: 2.4 10E9/L (ref 1.6–8.3)
NEUTROPHILS NFR BLD AUTO: 46.6 %
PLATELET # BLD AUTO: 325 10E9/L (ref 150–450)
POTASSIUM SERPL-SCNC: 4.4 MMOL/L (ref 3.4–5.3)
RBC # BLD AUTO: 4 10E12/L (ref 3.8–5.2)
SODIUM SERPL-SCNC: 139 MMOL/L (ref 133–144)
WBC # BLD AUTO: 5.2 10E9/L (ref 4–11)

## 2021-04-02 PROCEDURE — 82728 ASSAY OF FERRITIN: CPT | Performed by: NURSE PRACTITIONER

## 2021-04-02 PROCEDURE — 85025 COMPLETE CBC W/AUTO DIFF WBC: CPT | Performed by: NURSE PRACTITIONER

## 2021-04-02 PROCEDURE — 99395 PREV VISIT EST AGE 18-39: CPT | Performed by: NURSE PRACTITIONER

## 2021-04-02 PROCEDURE — 80048 BASIC METABOLIC PNL TOTAL CA: CPT | Performed by: NURSE PRACTITIONER

## 2021-04-02 PROCEDURE — 86803 HEPATITIS C AB TEST: CPT | Performed by: NURSE PRACTITIONER

## 2021-04-02 PROCEDURE — 99214 OFFICE O/P EST MOD 30 MIN: CPT | Mod: 25 | Performed by: NURSE PRACTITIONER

## 2021-04-02 PROCEDURE — 36415 COLL VENOUS BLD VENIPUNCTURE: CPT | Performed by: NURSE PRACTITIONER

## 2021-04-02 RX ORDER — FERROUS SULFATE 325(65) MG
325 TABLET ORAL
COMMUNITY
End: 2023-04-07

## 2021-04-02 RX ORDER — NORGESTIMATE AND ETHINYL ESTRADIOL 7DAYSX3 28
1 KIT ORAL DAILY
Qty: 84 TABLET | Refills: 3 | Status: SHIPPED | OUTPATIENT
Start: 2021-04-02 | End: 2022-02-23

## 2021-04-02 ASSESSMENT — ENCOUNTER SYMPTOMS
HEADACHES: 0
FREQUENCY: 0
PALPITATIONS: 0
CHILLS: 0
COUGH: 0
EYE PAIN: 0
HEMATURIA: 0
DIARRHEA: 0
BREAST MASS: 0
ARTHRALGIAS: 0
NERVOUS/ANXIOUS: 0
MYALGIAS: 0
JOINT SWELLING: 0
DYSURIA: 0
FEVER: 0
DIZZINESS: 1
SORE THROAT: 0
PARESTHESIAS: 0
ABDOMINAL PAIN: 0
SHORTNESS OF BREATH: 0
NAUSEA: 0
CONSTIPATION: 0
WEAKNESS: 0
HEMATOCHEZIA: 0
HEARTBURN: 0

## 2021-04-02 ASSESSMENT — MIFFLIN-ST. JEOR: SCORE: 1248.49

## 2021-04-02 NOTE — PATIENT INSTRUCTIONS
I will get back to you regarding lab results.    Refill sent on your birth control pill.       Preventive Health Recommendations  Female Ages 21 to 25     Yearly exam:     See your health care provider every year in order to  o Review health changes.   o Discuss preventive care.    o Review your medicines if your doctor has prescribed any.      You should be tested each year for STDs (sexually transmitted diseases).       Talk to your provider about how often you should have cholesterol testing.      Get a Pap test every three years. If you have an abnormal result, your doctor may have you test more often.      If you are at risk for diabetes, you should have a diabetes test (fasting glucose).     Shots:     Get a flu shot each year.     Get a tetanus shot every 10 years.     Consider getting the shot (vaccine) that prevents cervical cancer (Gardasil).    Nutrition:     Eat at least 5 servings of fruits and vegetables each day.    Eat whole-grain bread, whole-wheat pasta and brown rice instead of white grains and rice.    Get adequate Calcium and Vitamin D.     Lifestyle    Exercise at least 150 minutes a week each week (30 minutes a day, 5 days a week). This will help you control your weight and prevent disease.    Limit alcohol to one drink per day.    No smoking.     Wear sunscreen to prevent skin cancer.    See your dentist every six months for an exam and cleaning.

## 2021-04-02 NOTE — LETTER
April 5, 2021      Toña Ruggiero  [id number] [facility] [address]        Toña,     Labs are normal including blood counts, iron, and metabolic panel.  Screening for hepatitis C is negative.   Please return if worsening or not improving.     Nidhi Islas CNP     Resulted Orders   CBC with platelets and differential   Result Value Ref Range    WBC 5.2 4.0 - 11.0 10e9/L    RBC Count 4.00 3.8 - 5.2 10e12/L    Hemoglobin 12.8 11.7 - 15.7 g/dL    Hematocrit 38.9 35.0 - 47.0 %    MCV 97 78 - 100 fl    MCH 32.0 26.5 - 33.0 pg    MCHC 32.9 31.5 - 36.5 g/dL    RDW 12.3 10.0 - 15.0 %    Platelet Count 325 150 - 450 10e9/L    % Neutrophils 46.6 %    % Lymphocytes 36.5 %    % Monocytes 10.9 %    % Eosinophils 5.4 %    % Basophils 0.6 %    Absolute Neutrophil 2.4 1.6 - 8.3 10e9/L    Absolute Lymphocytes 1.9 0.8 - 5.3 10e9/L    Absolute Monocytes 0.6 0.0 - 1.3 10e9/L    Absolute Eosinophils 0.3 0.0 - 0.7 10e9/L    Absolute Basophils 0.0 0.0 - 0.2 10e9/L    Diff Method Automated Method    Ferritin   Result Value Ref Range    Ferritin 49 12 - 150 ng/mL   Basic metabolic panel   Result Value Ref Range    Sodium 139 133 - 144 mmol/L    Potassium 4.4 3.4 - 5.3 mmol/L    Chloride 109 94 - 109 mmol/L    Carbon Dioxide 27 20 - 32 mmol/L    Anion Gap 3 3 - 14 mmol/L    Glucose 73 70 - 99 mg/dL      Comment:      Fasting specimen    Urea Nitrogen 14 7 - 30 mg/dL    Creatinine 0.78 0.52 - 1.04 mg/dL    GFR Estimate >90 >60 mL/min/[1.73_m2]      Comment:      Non  GFR Calc  Starting 12/18/2018, serum creatinine based estimated GFR (eGFR) will be   calculated using the Chronic Kidney Disease Epidemiology Collaboration   (CKD-EPI) equation.      GFR Estimate If Black >90 >60 mL/min/[1.73_m2]      Comment:       GFR Calc  Starting 12/18/2018, serum creatinine based estimated GFR (eGFR) will be   calculated using the Chronic Kidney Disease Epidemiology Collaboration   (CKD-EPI) equation.       Calcium 9.3 8.5 - 10.1 mg/dL   Hepatitis C Screen Reflex to HCV RNA Quant and Genotype   Result Value Ref Range    Hepatitis C Antibody Nonreactive NR^Nonreactive      Comment:      Assay performance characteristics have not been established for newborns,   infants, and children         If you have any questions or concerns, please call the clinic at the number listed above.       Sincerely,      Nidhi Islas, CNP/sp

## 2021-04-02 NOTE — PROGRESS NOTES
SUBJECTIVE:   CC: Toña Ruggiero is an 20 year old woman who presents for preventive health visit.       Patient is here for annual physical.   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     Only health concern is intermittent dizziness.  This is usually upon standing.  There is mild dizziness is transient, and resolves with time.  No chest pain, palpitations, shortness of breath or syncope.  She thought her iron was possibly low with starting iron supplement, symptoms seem to improve.  She reports a history of iron deficiency anemia in the past.    Patient is otherwise feeling well and denies any significant medical concerns.    Her Pap is up-to-date, completed in 2019.    She denies any family history of breast or colon cancers.    Patient has been advised of split billing requirements and indicates understanding: Yes  Healthy Habits:     Getting at least 3 servings of Calcium per day:  Yes    Bi-annual eye exam:  Yes    Dental care twice a year:  Yes    Sleep apnea or symptoms of sleep apnea:  None    Diet:  Regular (no restrictions)    Frequency of exercise:  2-3 days/week    Duration of exercise:  30-45 minutes    Taking medications regularly:  Yes    Medication side effects:  None    PHQ-2 Total Score: 0    Additional concerns today:  Yes      Today's PHQ-2 Score:   PHQ-2 ( 1999 Pfizer) 4/2/2021   Q1: Little interest or pleasure in doing things 0   Q2: Feeling down, depressed or hopeless 0   PHQ-2 Score 0   Q1: Little interest or pleasure in doing things Not at all   Q2: Feeling down, depressed or hopeless Not at all   PHQ-2 Score 0       Abuse: Current or Past (Physical, Sexual or Emotional) - No  Do you feel safe in your environment? Yes    Have you ever done Advance Care Planning? (For example, a Health Directive, POLST, or a discussion with a medical provider or your loved ones about your wishes): No, advance care planning  information given to patient to review.  Patient declined advance care planning discussion at this time.    Social History     Tobacco Use     Smoking status: Never Smoker     Smokeless tobacco: Never Used   Substance Use Topics     Alcohol use: Yes     If you drink alcohol do you typically have >3 drinks per day or >7 drinks per week? No    Alcohol Use 4/2/2021   Prescreen: >3 drinks/day or >7 drinks/week? No   Prescreen: >3 drinks/day or >7 drinks/week? -       Reviewed orders with patient.  Reviewed health maintenance and updated orders accordingly - Yes      History of abnormal Pap smear: NO - age 21-29 PAP every 3 years recommended  PAP / HPV 7/31/2019   PAP NIL     Reviewed and updated as needed this visit by clinical staff  Tobacco  Allergies  Meds  Problems  Med Hx  Surg Hx  Fam Hx          Reviewed and updated as needed this visit by Provider  Tobacco  Allergies  Meds  Problems  Med Hx  Surg Hx  Fam Hx             Review of Systems   Constitutional: Negative for chills and fever.   HENT: Negative for congestion, ear pain, hearing loss and sore throat.    Eyes: Negative for pain and visual disturbance.   Respiratory: Negative for cough and shortness of breath.    Cardiovascular: Negative for chest pain, palpitations and peripheral edema.   Gastrointestinal: Negative for abdominal pain, constipation, diarrhea, heartburn, hematochezia and nausea.   Breasts:  Negative for tenderness, breast mass and discharge.   Genitourinary: Negative for dysuria, frequency, genital sores, hematuria, pelvic pain, urgency, vaginal bleeding and vaginal discharge.   Musculoskeletal: Negative for arthralgias, joint swelling and myalgias.   Skin: Negative for rash.   Neurological: Positive for dizziness. Negative for weakness, headaches and paresthesias.   Psychiatric/Behavioral: Negative for mood changes. The patient is not nervous/anxious.           OBJECTIVE:   /71   Pulse 72   Temp 96.1  F (35.6  C)   Ht  "1.575 m (5' 2\")   Wt 53.5 kg (118 lb)   SpO2 100%   BMI 21.58 kg/m    Physical Exam  GENERAL: healthy, alert and no distress  EYES: Eyes grossly normal to inspection, PERRL and conjunctivae and sclerae normal  HENT: ear canals and TM's normal, nose and mouth without ulcers or lesions  NECK: no adenopathy, no asymmetry, masses, or scars and thyroid normal to palpation  RESP: lungs clear to auscultation - no rales, rhonchi or wheezes  BREAST: normal without masses, tenderness or nipple discharge and no palpable axillary masses or adenopathy  CV: regular rate and rhythm, normal S1 S2, no S3 or S4, no murmur, click or rub, no peripheral edema and peripheral pulses strong  ABDOMEN: soft, nontender, no hepatosplenomegaly, no masses and bowel sounds normal  MS: no gross musculoskeletal defects noted, no edema  SKIN: no suspicious lesions or rashes  NEURO: Normal strength and tone, mentation intact and speech normal  PSYCH: mentation appears normal, affect normal/bright    Diagnostic Test Results:  Labs reviewed in Epic    ASSESSMENT/PLAN:   1. Routine general medical examination at a health care facility  Continue with annual exams.    2. Dizziness  Dizziness is positional, most often from sitting to standing.  Consider possible orthostatic hypotension, dehydration.  She noted some improvement with starting an iron supplement.  Check labs.    Change positions slowly, ensure adequate fluid intake.  Return if worsening or not improving.  - CBC with platelets and differential  - Ferritin  - Basic metabolic panel    3. Need for hepatitis C screening test  - Hepatitis C Screen Reflex to HCV RNA Quant and Genotype    4. Encounter for surveillance of contraceptive pills  Requested refill.  Denies medication side effects.  No history of blood clots, cancers, or CV disease.  - norgestim-eth estrad triphasic (TRI-SPRINTEC) 0.18/0.215/0.25 MG-35 MCG tablet; Take 1 tablet by mouth daily  Dispense: 84 tablet; Refill: 3    5. Acne " "vulgaris  See above.   - norgestim-eth estrad triphasic (TRI-SPRINTEC) 0.18/0.215/0.25 MG-35 MCG tablet; Take 1 tablet by mouth daily  Dispense: 84 tablet; Refill: 3    Patient has been advised of split billing requirements and indicates understanding: Yes  COUNSELING:  Reviewed preventive health counseling, as reflected in patient instructions    Estimated body mass index is 21.58 kg/m  as calculated from the following:    Height as of this encounter: 1.575 m (5' 2\").    Weight as of this encounter: 53.5 kg (118 lb).      She reports that she has never smoked. She has never used smokeless tobacco.      Counseling Resources:  ATP IV Guidelines  Pooled Cohorts Equation Calculator  Breast Cancer Risk Calculator  BRCA-Related Cancer Risk Assessment: FHS-7 Tool  FRAX Risk Assessment  ICSI Preventive Guidelines  Dietary Guidelines for Americans, 2010  USDA's MyPlate  ASA Prophylaxis  Lung CA Screening    Nidhi Islas, [facility]  "

## 2021-09-26 ENCOUNTER — HEALTH MAINTENANCE LETTER (OUTPATIENT)
Age: 21
End: 2021-09-26

## 2022-05-08 ENCOUNTER — HEALTH MAINTENANCE LETTER (OUTPATIENT)
Age: 22
End: 2022-05-08

## 2023-01-08 ENCOUNTER — HEALTH MAINTENANCE LETTER (OUTPATIENT)
Age: 23
End: 2023-01-08

## 2023-03-31 ASSESSMENT — ENCOUNTER SYMPTOMS
PALPITATIONS: 0
SORE THROAT: 0
HEMATURIA: 0
EYE PAIN: 0
SHORTNESS OF BREATH: 0
ARTHRALGIAS: 0
MYALGIAS: 0
HEADACHES: 0
ABDOMINAL PAIN: 0
JOINT SWELLING: 0
NERVOUS/ANXIOUS: 0
CONSTIPATION: 0
WEAKNESS: 0
NAUSEA: 0
COUGH: 0
FEVER: 0
DYSURIA: 0
HEMATOCHEZIA: 0
DIZZINESS: 0
FREQUENCY: 0
DIARRHEA: 0
PARESTHESIAS: 0
HEARTBURN: 0
CHILLS: 0
BREAST MASS: 0

## 2023-04-07 ENCOUNTER — OFFICE VISIT (OUTPATIENT)
Dept: FAMILY MEDICINE | Facility: CLINIC | Age: 23
End: 2023-04-07
Payer: COMMERCIAL

## 2023-04-07 VITALS
HEIGHT: 62 IN | TEMPERATURE: 97.5 F | WEIGHT: 108 LBS | HEART RATE: 81 BPM | BODY MASS INDEX: 19.88 KG/M2 | OXYGEN SATURATION: 100 % | DIASTOLIC BLOOD PRESSURE: 61 MMHG | SYSTOLIC BLOOD PRESSURE: 103 MMHG

## 2023-04-07 DIAGNOSIS — Z00.00 ROUTINE GENERAL MEDICAL EXAMINATION AT A HEALTH CARE FACILITY: Primary | ICD-10-CM

## 2023-04-07 DIAGNOSIS — Z12.4 CERVICAL CANCER SCREENING: ICD-10-CM

## 2023-04-07 DIAGNOSIS — Z30.41 ENCOUNTER FOR SURVEILLANCE OF CONTRACEPTIVE PILLS: ICD-10-CM

## 2023-04-07 DIAGNOSIS — L70.0 ACNE VULGARIS: ICD-10-CM

## 2023-04-07 PROCEDURE — G0145 SCR C/V CYTO,THINLAYER,RESCR: HCPCS | Performed by: NURSE PRACTITIONER

## 2023-04-07 PROCEDURE — 99395 PREV VISIT EST AGE 18-39: CPT | Performed by: NURSE PRACTITIONER

## 2023-04-07 RX ORDER — NORGESTIMATE AND ETHINYL ESTRADIOL 7DAYSX3 28
1 KIT ORAL DAILY
Qty: 84 TABLET | Refills: 3 | Status: SHIPPED | OUTPATIENT
Start: 2023-04-07 | End: 2024-03-11

## 2023-04-07 RX ORDER — CETIRIZINE HYDROCHLORIDE 10 MG/1
1 TABLET ORAL DAILY
COMMUNITY
Start: 2023-03-03

## 2023-04-07 ASSESSMENT — ENCOUNTER SYMPTOMS
WEAKNESS: 0
MYALGIAS: 0
ARTHRALGIAS: 0
DIZZINESS: 0
ABDOMINAL PAIN: 0
EYE PAIN: 0
DYSURIA: 0
NERVOUS/ANXIOUS: 0
HEARTBURN: 0
CHILLS: 0
SHORTNESS OF BREATH: 0
DIARRHEA: 0
BREAST MASS: 0
HEMATOCHEZIA: 0
NAUSEA: 0
SORE THROAT: 0
HEADACHES: 0
CONSTIPATION: 0
FEVER: 0
PARESTHESIAS: 0
FREQUENCY: 0
COUGH: 0
HEMATURIA: 0
JOINT SWELLING: 0
PALPITATIONS: 0

## 2023-04-07 ASSESSMENT — PAIN SCALES - GENERAL: PAINLEVEL: NO PAIN (0)

## 2023-04-07 NOTE — PROGRESS NOTES
SUBJECTIVE:   CC: Toña is an 22 year old who presents for preventive health visit.         4/7/2023    10:13 AM   Additional Questions   Roomed by brandee adam   Accompanied by self   Patient has been advised of split billing requirements and indicates understanding: Yes  Healthy Habits:     Getting at least 3 servings of Calcium per day:  Yes    Bi-annual eye exam:  Yes    Dental care twice a year:  Yes    Sleep apnea or symptoms of sleep apnea:  None    Diet:  Regular (no restrictions)    Frequency of exercise:  2-3 days/week    Duration of exercise:  15-30 minutes    Taking medications regularly:  Yes    Medication side effects:  None    PHQ-2 Total Score: 0    Additional concerns today:  No      Today's PHQ-2 Score:       3/31/2023     2:11 PM   PHQ-2 ( 1999 Pfizer)   Q1: Little interest or pleasure in doing things 0   Q2: Feeling down, depressed or hopeless 0   PHQ-2 Score 0   Q1: Little interest or pleasure in doing things Not at all   Q2: Feeling down, depressed or hopeless Not at all   PHQ-2 Score 0       Social History     Tobacco Use     Smoking status: Never     Smokeless tobacco: Never   Vaping Use     Vaping status: Not on file   Substance Use Topics     Alcohol use: Yes           3/31/2023     2:10 PM   Alcohol Use   Prescreen: >3 drinks/day or >7 drinks/week? No     Reviewed orders with patient.  Reviewed health maintenance and updated orders accordingly - Yes      Breast Cancer Screening:      History of abnormal Pap smear: NO - age 21-29 PAP every 3 years recommended      7/31/2019     5:47 PM   PAP / HPV   PAP (Historical) NIL       Reviewed and updated as needed this visit by clinical staff   Tobacco  Allergies  Meds  Problems  Med Hx  Surg Hx  Fam Hx          Reviewed and updated as needed this visit by Provider   Tobacco  Allergies  Meds  Problems  Med Hx  Surg Hx  Fam Hx             Review of Systems   Constitutional: Negative for chills and fever.   HENT: Negative for congestion,  "ear pain, hearing loss and sore throat.    Eyes: Negative for pain and visual disturbance.   Respiratory: Negative for cough and shortness of breath.    Cardiovascular: Negative for chest pain, palpitations and peripheral edema.   Gastrointestinal: Negative for abdominal pain, constipation, diarrhea, heartburn, hematochezia and nausea.   Breasts:  Negative for tenderness, breast mass and discharge.   Genitourinary: Negative for dysuria, frequency, genital sores, hematuria, pelvic pain, urgency, vaginal bleeding and vaginal discharge.   Musculoskeletal: Negative for arthralgias, joint swelling and myalgias.   Skin: Negative for rash.   Neurological: Negative for dizziness, weakness, headaches and paresthesias.   Psychiatric/Behavioral: Negative for mood changes. The patient is not nervous/anxious.         OBJECTIVE:   /61   Pulse 81   Temp 97.5  F (36.4  C)   Ht 1.575 m (5' 2\")   Wt 49 kg (108 lb)   SpO2 100%   BMI 19.75 kg/m    Physical Exam  GENERAL: healthy, alert and no distress  EYES: Eyes grossly normal to inspection, PERRL and conjunctivae and sclerae normal  HENT: ear canals and TM's normal, nose and mouth without ulcers or lesions  NECK: no adenopathy, no asymmetry, masses, or scars and thyroid normal to palpation  RESP: lungs clear to auscultation - no rales, rhonchi or wheezes  BREAST: normal without masses, tenderness or nipple discharge and no palpable axillary masses or adenopathy  CV: regular rate and rhythm, normal S1 S2, no S3 or S4, no murmur, click or rub, no peripheral edema and peripheral pulses strong  ABDOMEN: soft, nontender, no hepatosplenomegaly, no masses and bowel sounds normal   (female): normal female external genitalia, normal urethral meatus, vaginal mucosa pink, moist, well rugated, and normal cervix/adnexa/uterus without masses or discharge  MS: no gross musculoskeletal defects noted, no edema  SKIN: no suspicious lesions or rashes  NEURO: Normal strength and tone, " mentation intact and speech normal  PSYCH: mentation appears normal, affect normal/bright    Diagnostic Test Results:  Labs reviewed in Epic    ASSESSMENT/PLAN:   (Z00.00) Routine general medical examination at a health care facility  (primary encounter diagnosis)  Comment:   Plan: continue annual physicals    (Z12.4) Cervical cancer screening  Comment: Pap completed today  Plan: Pap Screen only - recommended age 21 - 24 years          (Z30.41) Encounter for surveillance of contraceptive pills  Comment: Chronic, stable. Continue OCP.     Plan: norgestim-eth estrad triphasic (TRI-ESTARYLLA)         0.18/0.215/0.25 MG-35 MCG tablet          (L70.0) Acne vulgaris  Comment: Chronic, stable. Continue OCP.     Plan: norgestim-eth estrad triphasic (TRI-ESTARYLLA)         0.18/0.215/0.25 MG-35 MCG tablet            Patient has been advised of split billing requirements and indicates understanding: Yes      COUNSELING:  Reviewed preventive health counseling, as reflected in patient instructions        She reports that she has never smoked. She has never used smokeless tobacco.      Nidhi Islas, [facility]

## 2023-04-11 LAB
BKR LAB AP GYN ADEQUACY: NORMAL
BKR LAB AP GYN INTERPRETATION: NORMAL
BKR LAB AP HPV REFLEX: NO
BKR LAB AP PREVIOUS ABNORMAL: NORMAL
PATH REPORT.COMMENTS IMP SPEC: NORMAL
PATH REPORT.COMMENTS IMP SPEC: NORMAL
PATH REPORT.RELEVANT HX SPEC: NORMAL

## 2024-03-09 DIAGNOSIS — L70.0 ACNE VULGARIS: ICD-10-CM

## 2024-03-09 DIAGNOSIS — Z30.41 ENCOUNTER FOR SURVEILLANCE OF CONTRACEPTIVE PILLS: ICD-10-CM

## 2024-03-11 RX ORDER — NORGESTIMATE AND ETHINYL ESTRADIOL 7DAYSX3 28
1 KIT ORAL DAILY
Qty: 84 TABLET | Refills: 0 | Status: SHIPPED | OUTPATIENT
Start: 2024-03-11 | End: 2024-04-26

## 2024-04-26 ENCOUNTER — OFFICE VISIT (OUTPATIENT)
Dept: FAMILY MEDICINE | Facility: CLINIC | Age: 24
End: 2024-04-26
Payer: COMMERCIAL

## 2024-04-26 VITALS
DIASTOLIC BLOOD PRESSURE: 80 MMHG | BODY MASS INDEX: 21.42 KG/M2 | WEIGHT: 116.4 LBS | HEIGHT: 62 IN | RESPIRATION RATE: 20 BRPM | HEART RATE: 70 BPM | TEMPERATURE: 98.3 F | SYSTOLIC BLOOD PRESSURE: 122 MMHG | OXYGEN SATURATION: 100 %

## 2024-04-26 DIAGNOSIS — Z00.00 ROUTINE GENERAL MEDICAL EXAMINATION AT A HEALTH CARE FACILITY: Primary | ICD-10-CM

## 2024-04-26 DIAGNOSIS — L70.0 ACNE VULGARIS: ICD-10-CM

## 2024-04-26 DIAGNOSIS — Z30.41 ENCOUNTER FOR SURVEILLANCE OF CONTRACEPTIVE PILLS: ICD-10-CM

## 2024-04-26 PROCEDURE — 99395 PREV VISIT EST AGE 18-39: CPT | Performed by: NURSE PRACTITIONER

## 2024-04-26 RX ORDER — NORGESTIMATE AND ETHINYL ESTRADIOL 7DAYSX3 28
1 KIT ORAL DAILY
Qty: 84 TABLET | Refills: 3 | Status: SHIPPED | OUTPATIENT
Start: 2024-04-26

## 2024-04-26 RX ORDER — FLUTICASONE PROPIONATE 50 MCG
SPRAY, SUSPENSION (ML) NASAL
COMMUNITY

## 2024-04-26 SDOH — HEALTH STABILITY: PHYSICAL HEALTH: ON AVERAGE, HOW MANY DAYS PER WEEK DO YOU ENGAGE IN MODERATE TO STRENUOUS EXERCISE (LIKE A BRISK WALK)?: 3 DAYS

## 2024-04-26 SDOH — HEALTH STABILITY: PHYSICAL HEALTH: ON AVERAGE, HOW MANY MINUTES DO YOU ENGAGE IN EXERCISE AT THIS LEVEL?: 40 MIN

## 2024-04-26 ASSESSMENT — SOCIAL DETERMINANTS OF HEALTH (SDOH): HOW OFTEN DO YOU GET TOGETHER WITH FRIENDS OR RELATIVES?: THREE TIMES A WEEK

## 2024-04-26 ASSESSMENT — PAIN SCALES - GENERAL: PAINLEVEL: NO PAIN (0)

## 2024-04-26 NOTE — PATIENT INSTRUCTIONS
Preventive Care Advice   This is general advice given by our system to help you stay healthy. However, your care team may have specific advice just for you. Please talk to your care team about your preventive care needs.  Nutrition  Eat 5 or more servings of fruits and vegetables each day.  Try wheat bread, brown rice and whole grain pasta (instead of white bread, rice, and pasta).  Get enough calcium and vitamin D. Check the label on foods and aim for 100% of the RDA (recommended daily allowance).  Lifestyle  Exercise at least 150 minutes each week   (30 minutes a day, 5 days a week).  Do muscle strengthening activities 2 days a week. These help control your weight and prevent disease.  No smoking.  Wear sunscreen to prevent skin cancer.  Have a dental exam and cleaning every 6 months.  Yearly exams  See your health care team every year to talk about:  Any changes in your health.  Any medicines your care team has prescribed.  Preventive care, family planning, and ways to prevent chronic diseases.  Shots (vaccines)   HPV shots (up to age 26), if you've never had them before.  Hepatitis B shots (up to age 59), if you've never had them before.  COVID-19 shot: Get this shot when it's due.  Flu shot: Get a flu shot every year.  Tetanus shot: Get a tetanus shot every 10 years.  Pneumococcal, hepatitis A, and RSV shots: Ask your care team if you need these based on your risk.  Shingles shot (for age 50 and up).  General health tests  Diabetes screening:  Starting at age 35, Get screened for diabetes at least every 3 years.  If you are younger than age 35, ask your care team if you should be screened for diabetes.  Cholesterol test: At age 39, start having a cholesterol test every 5 years, or more often if advised.  Bone density scan (DEXA): At age 50, ask your care team if you should have this scan for osteoporosis (brittle bones).  Hepatitis C: Get tested at least once in your life.  STIs (sexually transmitted  infections)  Before age 24: Ask your care team if you should be screened for STIs.  After age 24: Get screened for STIs if you're at risk. You are at risk for STIs (including HIV) if:  You are sexually active with more than one person.  You don't use condoms every time.  You or a partner was diagnosed with a sexually transmitted infection.  If you are at risk for HIV, ask about PrEP medicine to prevent HIV.  Get tested for HIV at least once in your life, whether you are at risk for HIV or not.  Cancer screening tests  Cervical cancer screening: If you have a cervix, begin getting regular cervical cancer screening tests at age 21. Most people who have regular screenings with normal results can stop after age 65. Talk about this with your provider.  Breast cancer scan (mammogram): If you've ever had breasts, begin having regular mammograms starting at age 40. This is a scan to check for breast cancer.  Colon cancer screening: It is important to start screening for colon cancer at age 45.  Have a colonoscopy test every 10 years (or more often if you're at risk) Or, ask your provider about stool tests like a FIT test every year or Cologuard test every 3 years.  To learn more about your testing options, visit: https://www.[employer]/688853.pdf.  For help making a decision, visit: https://bit.ly/lz03756.  Prostate cancer screening test: If you have a prostate and are age 55 to 69, ask your provider if you would benefit from a yearly prostate cancer screening test.  Lung cancer screening: If you are a current or former smoker age 50 to 80, ask your care team if ongoing lung cancer screenings are right for you.  For informational purposes only. Not to replace the advice of your health care provider. Copyright   2023 [employer]. All rights reserved. Clinically reviewed by the [facility] Transitions Program. [employer] [id number] - REV 01/24.

## 2024-04-26 NOTE — PROGRESS NOTES
Preventive Care Visit  [facility]  ANABEL Mejía CNP, Family Medicine  Apr 26, 2024      Assessment & Plan     Routine general medical examination at a health care facility      Encounter for surveillance of contraceptive pills  -no side effects or contraindications to COCs identified.   - norgestim-eth estrad triphasic (ORTHO TRI-CYCLEN) 0.18/0.215/0.25 MG-35 MCG tablet; Take 1 tablet by mouth daily    Acne vulgaris    - norgestim-eth estrad triphasic (ORTHO TRI-CYCLEN) 0.18/0.215/0.25 MG-35 MCG tablet; Take 1 tablet by mouth daily        Counseling  Appropriate preventive services were discussed with this patient, including applicable screening as appropriate for fall prevention, nutrition, physical activity, Tobacco-use cessation, weight loss and cognition.  Checklist reviewing preventive services available has been given to the patient.  Reviewed patient's diet, addressing concerns and/or questions.   She is at risk for lack of exercise and has been provided with information to increase physical activity for the benefit of her well-being.           Ruthie Ding is a 24 year old, presenting for the following:  Physical        4/26/2024     3:44 PM   Additional Questions   Roomed by Barbara PU[facility]   Accompanied by self        Health Care Directive  Patient does not have a Health Care Directive or Living Will: Discussed advance care planning with patient; however, patient declined at this time.    Patient here for yearly preventative care visit. In addition, they have the following concerns:    1. No concerns            Non fasting visit       4/26/2024   General Health   How would you rate your overall physical health? Excellent   Feel stress (tense, anxious, or unable to sleep) Not at all         4/26/2024   Nutrition   Three or more servings of calcium each day? Yes   Diet: Regular (no restrictions)   How many servings of fruit and vegetables per day? (!) 2-3   How many sweetened  beverages each day? 0-1         4/26/2024   Exercise   Days per week of moderate/strenous exercise 3 days   Average minutes spent exercising at this level 40 min         4/26/2024   Social Factors   Frequency of gathering with friends or relatives Three times a week   Worry food won't last until get money to buy more No   Food not last or not have enough money for food? No   Do you have housing?  Yes   Are you worried about losing your housing? No   Lack of transportation? No   Unable to get utilities (heat,electricity)? No         4/26/2024   Dental   Dentist two times every year? Yes         4/26/2024   TB Screening   Were you born outside of the [place]? No         Today's PHQ-2 Score:       4/26/2024     9:39 AM   PHQ-2 ( 1999 [employer])   Q1: Little interest or pleasure in doing things 0   Q2: Feeling down, depressed or hopeless 0   PHQ-2 Score 0   Q1: Little interest or pleasure in doing things Not at all   Q2: Feeling down, depressed or hopeless Not at all   PHQ-2 Score 0           4/26/2024   Substance Use   Alcohol more than 3/day or more than 7/wk No   Do you use any other substances recreationally? No     Social History     Tobacco Use    Smoking status: Never    Smokeless tobacco: Never   Vaping Use    Vaping status: Never Used   Substance Use Topics    Alcohol use: Yes    Drug use: No          Mammogram Screening - Patient under 40 years of age: Routine Mammogram Screening not recommended.         4/26/2024   STI Screening   New sexual partner(s) since last STI/HIV test? No     History of abnormal Pap smear: NO - age 21-29 PAP every 3 years recommended        4/7/2023    10:44 AM 7/31/2019     5:47 PM   PAP / HPV   PAP Negative for Intraepithelial Lesion or Malignancy (NILM)     PAP (Historical)  NIL            4/26/2024   Contraception/Family Planning   Questions about contraception or family planning No       Reviewed and updated as needed this visit by Provider   Tobacco  Allergies  Meds  Problems    Fam  "Hx  Soc Hx Sexual Activity                 Objective    Exam  /80   Pulse 70   Temp 98.3  F (36.8  C) (Tympanic)   Resp 20   Ht 1.575 m (5' 2\")   Wt 52.8 kg (116 lb 6.4 oz)   LMP 04/05/2024 (Approximate)   SpO2 100%   BMI 21.29 kg/m     Estimated body mass index is 21.29 kg/m  as calculated from the following:    Height as of this encounter: 1.575 m (5' 2\").    Weight as of this encounter: 52.8 kg (116 lb 6.4 oz).    Physical Exam  Constitutional:       Appearance: Normal appearance. She is not ill-appearing.   HENT:      Right Ear: Tympanic membrane, ear canal and external ear normal.      Left Ear: Tympanic membrane, ear canal and external ear normal.      Nose: Nose normal. No congestion.      Mouth/Throat:      Mouth: Mucous membranes are moist.      Pharynx: Oropharynx is clear.   Eyes:      Pupils: Pupils are equal, round, and reactive to light.   Cardiovascular:      Rate and Rhythm: Normal rate and regular rhythm.      Pulses: Normal pulses.      Heart sounds: Normal heart sounds. No murmur heard.     No friction rub. No gallop.   Pulmonary:      Effort: Pulmonary effort is normal.      Breath sounds: Normal breath sounds.   Abdominal:      General: Abdomen is flat. Bowel sounds are normal.      Palpations: Abdomen is soft.   Musculoskeletal:         General: Normal range of motion.      Cervical back: Normal range of motion.   Lymphadenopathy:      Cervical: No cervical adenopathy.   Skin:     General: Skin is warm and dry.   Neurological:      General: No focal deficit present.      Mental Status: She is alert and oriented to person, place, and time.   Psychiatric:         Mood and Affect: Mood normal.         Behavior: Behavior normal.         Thought Content: Thought content normal.         Judgment: Judgment normal.               Signed Electronically by: ANABEL Mejía CNP    "

## 2025-05-20 DIAGNOSIS — Z30.41 ENCOUNTER FOR SURVEILLANCE OF CONTRACEPTIVE PILLS: ICD-10-CM

## 2025-05-20 DIAGNOSIS — L70.0 ACNE VULGARIS: ICD-10-CM

## 2025-05-21 RX ORDER — NORGESTIMATE AND ETHINYL ESTRADIOL 7DAYSX3 28
1 KIT ORAL
Qty: 84 TABLET | Refills: 0 | Status: SHIPPED | OUTPATIENT
Start: 2025-05-21

## 2025-06-01 ENCOUNTER — HEALTH MAINTENANCE LETTER (OUTPATIENT)
Age: 25
End: 2025-06-01